# Patient Record
Sex: FEMALE | URBAN - METROPOLITAN AREA
[De-identification: names, ages, dates, MRNs, and addresses within clinical notes are randomized per-mention and may not be internally consistent; named-entity substitution may affect disease eponyms.]

---

## 2017-01-10 ENCOUNTER — APPOINTMENT (RX ONLY)
Age: 67
Setting detail: DERMATOLOGY
End: 2017-01-10

## 2017-01-10 DIAGNOSIS — B35.1 TINEA UNGUIUM: ICD-10-CM

## 2017-01-10 PROCEDURE — 99213 OFFICE O/P EST LOW 20 MIN: CPT

## 2017-01-10 PROCEDURE — ? ORDER TESTS

## 2017-01-10 PROCEDURE — ? PRESCRIPTION

## 2017-01-10 RX ORDER — TERBINAFINE HCL 250 MG
TABLET ORAL
Qty: 60 | Refills: 1 | Status: ERX | COMMUNITY
Start: 2017-01-10

## 2017-01-10 RX ADMIN — Medication: at 22:30

## 2017-01-10 ASSESSMENT — LOCATION DETAILED DESCRIPTION DERM: LOCATION DETAILED: RIGHT MEDIAL PLANTAR 1ST TOE

## 2017-01-10 ASSESSMENT — LOCATION SIMPLE DESCRIPTION DERM: LOCATION SIMPLE: PLANTAR SURFACE OF RIGHT 1ST TOE

## 2017-01-10 ASSESSMENT — LOCATION ZONE DERM: LOCATION ZONE: TOE

## 2017-01-10 NOTE — HPI: FREE FORM (FOLLOW UP HISTORY)
How Severe Is Your Skin Condition?: moderate
What Brings You In Today For Follow Up? (This Is An Xx Year Old Patient Who Is Following Up For:): onychomycosis
Where On Your Body Is It? (Located On:): toenails
What Was It Treated With? (The Condition Was Treated With:): oral fluconazole, lamisil cream
Since The Treatment Is Your Condition Better, Worse, Or Unchanged? (Since The Treatment, The Condition Is:): stable (right great toenail possible better, left great toenail possible a little worse)\\n\\nSee dictated note

## 2017-07-11 ENCOUNTER — APPOINTMENT (RX ONLY)
Age: 67
Setting detail: DERMATOLOGY
End: 2017-07-11

## 2017-07-11 DIAGNOSIS — B35.1 TINEA UNGUIUM: ICD-10-CM

## 2017-07-11 PROCEDURE — ? RECOMMENDATIONS

## 2017-07-11 PROCEDURE — 99213 OFFICE O/P EST LOW 20 MIN: CPT

## 2017-07-11 ASSESSMENT — LOCATION SIMPLE DESCRIPTION DERM
LOCATION SIMPLE: RIGHT GREAT TOE
LOCATION SIMPLE: LEFT GREAT TOE

## 2017-07-11 ASSESSMENT — LOCATION ZONE DERM: LOCATION ZONE: TOE

## 2017-07-11 ASSESSMENT — LOCATION DETAILED DESCRIPTION DERM
LOCATION DETAILED: RIGHT DORSAL GREAT TOE
LOCATION DETAILED: LEFT DISTAL PLANTAR GREAT TOE

## 2017-07-11 NOTE — PROCEDURE: RECOMMENDATIONS
Detail Level: Simple
Recommendation Preamble: Overall, the problem has improved, or is at least stable. She does want to check labs so will hold off on further oral terbinafine
Recommendations (Free Text): -Continue to keep nails clipped back \\n-use topical lamisil\\n-Call if worsens and we can refill 2 more months of terbinafine with lab work \\n

## 2017-07-11 NOTE — HPI: FREE FORM (FOLLOW UP HISTORY)
How Severe Is Your Skin Condition?: moderate
What Brings You In Today For Follow Up? (This Is An Xx Year Old Patient Who Is Following Up For:): Onychomycosis
Where On Your Body Is It? (Located On:): Right and Left great toenail
What Was It Treated With? (The Condition Was Treated With:): Terbinafine 250mg daily
How Did You Use It? (The Patient Used The Treatment In The Following Way:): once daily for 2 months
Since The Treatment Is Your Condition Better, Worse, Or Unchanged? (Since The Treatment, The Condition Is:): better